# Patient Record
Sex: FEMALE | Race: WHITE | Employment: FULL TIME | URBAN - NONMETROPOLITAN AREA
[De-identification: names, ages, dates, MRNs, and addresses within clinical notes are randomized per-mention and may not be internally consistent; named-entity substitution may affect disease eponyms.]

---

## 2021-12-20 ENCOUNTER — OFFICE VISIT (OUTPATIENT)
Dept: OBGYN CLINIC | Age: 36
End: 2021-12-20
Payer: COMMERCIAL

## 2021-12-20 VITALS — BODY MASS INDEX: 26.56 KG/M2 | WEIGHT: 159.4 LBS | HEIGHT: 65 IN

## 2021-12-20 DIAGNOSIS — Z01.411 ABNORMAL FEMALE PELVIC EXAM: Primary | ICD-10-CM

## 2021-12-20 DIAGNOSIS — N85.2 ENLARGED UTERUS: ICD-10-CM

## 2021-12-20 DIAGNOSIS — Z87.59 HISTORY OF ECTOPIC PREGNANCY: ICD-10-CM

## 2021-12-20 DIAGNOSIS — Z31.69 ENCOUNTER FOR GENERAL COUNSELING AND ADVICE ON PROCREATION: ICD-10-CM

## 2021-12-20 LAB
CONTROL: NORMAL
PREGNANCY TEST URINE, POC: NEGATIVE

## 2021-12-20 PROCEDURE — 81025 URINE PREGNANCY TEST: CPT | Performed by: ADVANCED PRACTICE MIDWIFE

## 2021-12-20 PROCEDURE — 99385 PREV VISIT NEW AGE 18-39: CPT | Performed by: ADVANCED PRACTICE MIDWIFE

## 2021-12-20 RX ORDER — LEVOTHYROXINE SODIUM 0.1 MG/1
TABLET ORAL
COMMUNITY
Start: 2021-11-22

## 2021-12-20 ASSESSMENT — ENCOUNTER SYMPTOMS
ABDOMINAL PAIN: 0
SHORTNESS OF BREATH: 0
RESPIRATORY NEGATIVE: 1
CONSTIPATION: 1
DIARRHEA: 0

## 2021-12-20 NOTE — PROGRESS NOTES
YEARLY PHYSICAL    Date of service: 2021    Chico Zhou  Is a 39 y.o.   female    PT's PCP is: SUKHWINDER Morales CNP     : 1985                                             Subjective:       Patient's last menstrual period was 2021 (exact date). Are your menses regular: yes    OB History    Para Term  AB Living   5 4 4   1 4   SAB IAB Ectopic Molar Multiple Live Births       1     4      # Outcome Date GA Lbr Sean/2nd Weight Sex Delivery Anes PTL Lv   5 Ectopic 2016 6w0d          4 Term 2008 38w0d  6 lb 3 oz (2.807 kg) M Vag-Spont   KAUSHAL   3 Term 2007 38w0d  6 lb 4 oz (2.835 kg) M Vag-Spont   KAUSHAL   2 Term 2006 40w0d  6 lb 9 oz (2.977 kg) M Vag-Spont   KAUSHAL      Birth Comments: water birth   1 Term 2003 40w0d  7 lb 8 oz (3.402 kg) F Vag-Spont   KAUSHAL      Birth Comments: water birth        Social History     Tobacco Use   Smoking Status Former Smoker    Packs/day: 0.25    Years: 3.00    Pack years: 0.75    Types: Cigarettes   Smokeless Tobacco Never Used        Social History     Substance and Sexual Activity   Alcohol Use Yes    Comment: occasionally       Family History   Problem Relation Age of Onset    Thyroid Disease Mother        Any family history of breast or ovarian cancer: No    Any family history of blood clots: No      Allergies: Patient has no known allergies.       Current Outpatient Medications:     levothyroxine (SYNTHROID) 100 MCG tablet, , Disp: , Rfl:     Social History     Substance and Sexual Activity   Sexual Activity Not on file       Any bleeding or pain with intercourse: No    Last Yearly:  >3 years    Last pap: Due     Last HPV: Due for cotesting    Last Mammogram: n/a    Do you do self breast exams: Encouraged    Past Medical History:   Diagnosis Date    Anxiety     Depression     Hypotension     Hypothyroidism        Past Surgical History:   Procedure Laterality Date    LAPAROSCOPY      Ectopic 2016    OVARIAN CYST REMOVAL Right 2016    Laparoscopic    WISDOM TOOTH EXTRACTION         Family History   Problem Relation Age of Onset    Thyroid Disease Mother        Chief Complaint   Patient presents with    Annual Exam     New annual.Pt has been TTC for 6 years. Pt had ectopic surgery and has had not luck trying to conceive. Labs:    No results found for this visit on 12/20/21. HPI: Denies breast concerns. Denies pelvic pain or concerns. Menses regular. Due for pap smear but currently day 3 of cycle. Cycles are regular every 26-28 days. Has hx of ectopic pregnancy - patient reports was seeing an RE in [facility] as TTC x 6 years but no pregnancy yet. Spouse had SA and was on medications, attempted IUI but unable to fully go through due to low counts for partner. RE recommended she have HSG 2 years ago. Would like to do this in our office. Constipation present between cycles. Review of Systems   Constitutional: Negative. Negative for chills, fatigue and fever. HENT: Negative. Respiratory: Negative. Negative for shortness of breath. Cardiovascular: Negative. Negative for chest pain. Gastrointestinal: Positive for constipation (When not on cycle has trouble going to bathroom for BM). Negative for abdominal pain and diarrhea. Genitourinary: Negative for dysuria, enuresis, frequency, menstrual problem, pelvic pain, urgency and vaginal bleeding. Musculoskeletal: Negative. Neurological: Negative. Negative for dizziness, light-headedness and headaches. Psychiatric/Behavioral: Negative. Objective  Height 5' 5\" (1.651 m), weight 159 lb 6.4 oz (72.3 kg), last menstrual period 12/17/2021. Physical Exam  Constitutional:       Appearance: Normal appearance. She is normal weight. Genitourinary:      Vulva, bladder and urethral meatus normal.      Vaginal bleeding (menses) present. No vaginal discharge or tenderness. No vaginal prolapse present. Right Adnexa: not tender. Left Adnexa: not tender. Adnexa exam comments: Unable to palpate adnexa due to size of uterus. No cervical motion tenderness or discharge. Uterus is enlarged (approx 20 weeks pregnant size) and irregular (right side > left side). Uterus is not tender. Pelvic exam was performed with patient in the lithotomy position. Breasts: Breasts are symmetrical.      Breasts are soft. Right: No mass, nipple discharge, skin change or tenderness. Left: No mass, nipple discharge, skin change or tenderness. HENT:      Head: Normocephalic. Eyes:      Pupils: Pupils are equal, round, and reactive to light. Neck:      Comments: Left sided thyroid nodules - follows yearly with dr loving   Cardiovascular:      Rate and Rhythm: Normal rate and regular rhythm. Pulses: Normal pulses. Heart sounds: Normal heart sounds. No murmur heard. Pulmonary:      Effort: Pulmonary effort is normal.      Breath sounds: Normal breath sounds. No wheezing. Abdominal:      Palpations: Abdomen is soft. Tenderness: There is no abdominal tenderness. Musculoskeletal:         General: Normal range of motion. Cervical back: Normal range of motion. No muscular tenderness. Neurological:      Mental Status: She is alert and oriented to person, place, and time. Skin:     General: Skin is warm and dry. Psychiatric:         Behavior: Behavior normal.   Vitals and nursing note reviewed. Chaperone present: Declined. Assessment and Plan          Diagnosis Orders   1. Abnormal female pelvic exam  POCT urine pregnancy   2. Enlarged uterus  POCT urine pregnancy   3. Encounter for general counseling and advice on procreation  POCT urine pregnancy     Will return next week for pelvic USN and f/u. At f/u we will do pap smear due to menses today.   Discussed that pap often obscured by blood or insufficient cells when obtained on menses - she was agreeable    She reports that uterus is \"always big like this\" - has never been dx with fibroids. Neg pregnancy test in our office. Discussed concern about size of uterus and need to have imaging and possible surgery to rule out abnormals such as malignancy. Patient agreeable - we will call her to schedule this USN/visit. Discussed that likely needs to continue with RE - sounds as if spouse has low semen counts and likely cause of infertility, patient reported that she does develop follicles. Discussed that we can perform HSG per her preference in our office but if normal then needs to return to RE. Recommended no further w/u for fertility until imaging of uterus. I am having Jordan Montoya maintain her levothyroxine. Return in about 1 week (around 12/27/2021) for Pelvic USN, Gyn f/u. She was also counseled on her preventative health maintenance recommendations and follow-up. There are no Patient Instructions on file for this visit.     SUKHWINDER Stewart CNM,12/20/2021 12:24 PM

## 2021-12-28 ENCOUNTER — HOSPITAL ENCOUNTER (OUTPATIENT)
Age: 36
Setting detail: SPECIMEN
Discharge: HOME | End: 2021-12-28

## 2021-12-28 ENCOUNTER — TELEPHONE (OUTPATIENT)
Dept: OBGYN CLINIC | Age: 36
End: 2021-12-28

## 2021-12-28 ENCOUNTER — OFFICE VISIT (OUTPATIENT)
Dept: OBGYN CLINIC | Age: 36
End: 2021-12-28
Payer: COMMERCIAL

## 2021-12-28 VITALS
BODY MASS INDEX: 26.59 KG/M2 | WEIGHT: 159.6 LBS | SYSTOLIC BLOOD PRESSURE: 100 MMHG | DIASTOLIC BLOOD PRESSURE: 70 MMHG | HEIGHT: 65 IN

## 2021-12-28 DIAGNOSIS — N83.8 OVARIAN MASS, RIGHT: Primary | ICD-10-CM

## 2021-12-28 DIAGNOSIS — N83.8 OVARIAN MASS, RIGHT: ICD-10-CM

## 2021-12-28 DIAGNOSIS — Z12.4 SCREENING FOR CERVICAL CANCER: ICD-10-CM

## 2021-12-28 LAB
CA 125: 39 U/ML
CARCINOEMBRYONIC ANTIGEN: 0.8 NG/ML

## 2021-12-28 PROCEDURE — 36415 COLL VENOUS BLD VENIPUNCTURE: CPT | Performed by: ADVANCED PRACTICE MIDWIFE

## 2021-12-28 PROCEDURE — 99214 OFFICE O/P EST MOD 30 MIN: CPT | Performed by: ADVANCED PRACTICE MIDWIFE

## 2021-12-28 ASSESSMENT — ENCOUNTER SYMPTOMS
ABDOMINAL DISTENTION: 1
BACK PAIN: 1
ABDOMINAL PAIN: 1
CONSTIPATION: 1

## 2021-12-28 NOTE — PROGRESS NOTES
Follow-up     Pt here for USN f/u. Pt denies any new concerns.  Other     Pap due. Physical Exam  Constitutional:       Appearance: Normal appearance. She is normal weight. Genitourinary:      No vaginal discharge. Right Adnexa: mass present. Adnexa exam comments: USN shows mass on right adnexa but unable to differentiate origination with pelvic exam.      Cervical friability present. HENT:      Head: Normocephalic. Eyes:      Pupils: Pupils are equal, round, and reactive to light. Pulmonary:      Effort: Pulmonary effort is normal.   Musculoskeletal:         General: Normal range of motion. Cervical back: Normal range of motion. Neurological:      Mental Status: She is alert and oriented to person, place, and time. Skin:     General: Skin is warm and dry. Psychiatric:         Mood and Affect: Mood normal.         Behavior: Behavior normal.      Comments: Tearful intermittently during exam   Vitals and nursing note reviewed. Vital Signs  Blood pressure 100/70, height 5' 5\" (1.651 m), weight 159 lb 9.6 oz (72.4 kg), last menstrual period 12/17/2021. HPI Here for pap smear - was on menses last visit - and also USN f/u. Reports since last visit has \"paid more attention\" to s/s and has noted low back pain daily, abdominal fullness, pelvic pressure in addition to bowel changes. Results reviewed today:    Uterus appears normal size 8.6 x 5.5 x 5.0 cm  Endometrium 10.2mm - currently mid cycle  Left ovary normal with dominant follicle which appears 2.6 x 2.6 x 2.7cm  In right adnexa appears to have a cystic mass which originates possibly from right ovary - multiple thin striations. Area measures 18.7 x 9.6 x 18.6cm                              Assessment and Plan          Diagnosis Orders   1. Ovarian mass, right      CEA    CT ABDOMEN PELVIS W WO CONTRAST Additional Contrast? Radiologist Recommendation   2.  Screening for cervical cancer  PAP SMEAR Discussed concerns with patient regarding pelvic mass. Discussed additional w/u with CEA,  and CT scan. Discussed once get these results will determine next steps in care management - referral or surgery in our office. Discussed concerns of cystic area including but not limited to malignancy. Will consult Dr Karen Echevarria regarding mgmt and update plan accordingly. I am having Jordan Montoya maintain her levothyroxine. No follow-ups on file. She was also counseled on her preventative health maintenance recommendations and follow-up. There are no Patient Instructions on file for this visit.     SUKHWINDER Serra CNM,12/28/2021 2:36 PM                   Electronically signed by SUKHWINDER Serra CNM

## 2021-12-28 NOTE — TELEPHONE ENCOUNTER
Dr Ozzie Munoz -     Saw patient last week for exam and her desires to TTC. Last seen - [facility] - few years back and no pelvic mass/concerns. Had been working with RE but due to financials stopped the process. Has slowly had \"growing\" abd    When came in last week I noted approx 20 week pelvic mass and ordered imaging to be done. Reports last year had 20-30# weight loss but admits that made small diet changes - now has gained some weight back. Does have pelvic fullness, constipation, back pain. Today on imaging appears to have a large (18.7 x 9.6 x 18.6cm) cystic mass with multiple thin striations. Uterus itself looks like normal size. Left ovary normal    Concerned obviously of malignancy - today ordered CT scan abd/pelvis and CEA, - any further thought? I told her additional mgmt and likely surgery would depend on these results.

## 2021-12-29 ENCOUNTER — HOSPITAL ENCOUNTER (OUTPATIENT)
Dept: CT IMAGING | Age: 36
Discharge: HOME | End: 2021-12-31
Payer: COMMERCIAL

## 2021-12-29 DIAGNOSIS — N83.8 OVARIAN MASS, RIGHT: ICD-10-CM

## 2021-12-29 PROCEDURE — 74177 CT ABD & PELVIS W/CONTRAST: CPT

## 2021-12-29 PROCEDURE — 6360000004 HC RX CONTRAST MEDICATION: Performed by: ADVANCED PRACTICE MIDWIFE

## 2021-12-29 PROCEDURE — 74178 CT ABD&PLV WO CNTR FLWD CNTR: CPT

## 2021-12-29 RX ADMIN — IOPAMIDOL 18 ML: 755 INJECTION, SOLUTION INTRAVENOUS at 10:56

## 2021-12-29 RX ADMIN — IOPAMIDOL 75 ML: 755 INJECTION, SOLUTION INTRAVENOUS at 11:56

## 2021-12-29 NOTE — TELEPHONE ENCOUNTER
If nl markers then can consider local surgery - otherwise would suggest gyn onc; chances are that it is serous cystadenoma which is usu benign

## 2021-12-30 DIAGNOSIS — N83.8 OVARIAN MASS, RIGHT: Primary | ICD-10-CM

## 2021-12-30 LAB
HPV SAMPLE: NORMAL
HPV, GENOTYPE 16: NOT DETECTED
HPV, GENOTYPE 18: NOT DETECTED
HPV, HIGH RISK OTHER: NOT DETECTED
HPV, INTERPRETATION: NORMAL
SPECIMEN DESCRIPTION: NORMAL

## 2021-12-30 NOTE — RESULT ENCOUNTER NOTE
Called [facility] and Patient has appt with Dr. Gucci Upton 1/5 next week at 10:30. She is aware and they are going to be calling her to go over everything prior.

## 2021-12-30 NOTE — RESULT ENCOUNTER NOTE
Referrals and calls made to numerous places, waiting to hear back on soonest appt. Did call patient and let her know we were working on it, she is leaning towards the [facility] as opposed to [facility] in [place].

## 2021-12-30 NOTE — PROGRESS NOTES
Spoke with Dr Laila Mary staff on the phone to see soonest available appt and they do require to see the information prior to determining. Referral sent and they will call to let us know soonest available.

## 2021-12-30 NOTE — PROGRESS NOTES
Patient also interested in [facility] for referral, paper chart sent for review to determine soonest available appt.

## 2022-01-06 LAB — CYTOLOGY REPORT: NORMAL

## 2022-06-28 ENCOUNTER — OFFICE VISIT (OUTPATIENT)
Dept: OBGYN CLINIC | Age: 37
End: 2022-06-28
Payer: COMMERCIAL

## 2022-06-28 VITALS
WEIGHT: 164.8 LBS | SYSTOLIC BLOOD PRESSURE: 110 MMHG | HEIGHT: 65 IN | BODY MASS INDEX: 27.46 KG/M2 | DIASTOLIC BLOOD PRESSURE: 80 MMHG

## 2022-06-28 DIAGNOSIS — E04.1 THYROID CYST: ICD-10-CM

## 2022-06-28 DIAGNOSIS — L65.9 THINNING HAIR: ICD-10-CM

## 2022-06-28 DIAGNOSIS — L85.3 DRY SKIN: Primary | ICD-10-CM

## 2022-06-28 DIAGNOSIS — Z13.220 SCREENING FOR LIPID DISORDERS: ICD-10-CM

## 2022-06-28 DIAGNOSIS — E03.9 HYPOTHYROIDISM, UNSPECIFIED TYPE: ICD-10-CM

## 2022-06-28 DIAGNOSIS — Z13.0 SCREENING FOR DEFICIENCY ANEMIA: ICD-10-CM

## 2022-06-28 DIAGNOSIS — Z13.89 ENCOUNTER FOR SCREENING FOR OTHER DISORDER: ICD-10-CM

## 2022-06-28 PROCEDURE — 99213 OFFICE O/P EST LOW 20 MIN: CPT | Performed by: ADVANCED PRACTICE MIDWIFE

## 2022-06-28 NOTE — PROGRESS NOTES
PROBLEM VISIT     Date of service: 2022    Rhonda Blevins  Is a 39 y.o.  female    PT's PCP is: Ya Richards DO     : 1985                                          Review of Systems   Constitutional:        \"just cannot lose weight\"     HENT: Negative. Genitourinary: Negative. Musculoskeletal: Negative. Neurological: Negative. Psychiatric/Behavioral: Negative. Patient's last menstrual period was 2022 (exact date). OB History    Para Term  AB Living   5 4 4   1 4   SAB IAB Ectopic Molar Multiple Live Births       1     4      # Outcome Date GA Lbr Sean/2nd Weight Sex Delivery Anes PTL Lv   5 Ectopic 2016 6w0d          4 Term 2008 38w0d  6 lb 3 oz (2.807 kg) M Vag-Spont   KAUSHAL   3 Term 2007 38w0d  6 lb 4 oz (2.835 kg) M Vag-Spont   KAUSHAL   2 Term 2006 40w0d  6 lb 9 oz (2.977 kg) M Vag-Spont   KAUSHAL      Birth Comments: water birth   1 Term 2003 40w0d  7 lb 8 oz (3.402 kg) F Vag-Spont   KAUSHAL      Birth Comments: water birth        Social History     Tobacco Use   Smoking Status Former Smoker    Packs/day: 0.25    Years: 3.00    Pack years: 0.75    Types: Cigarettes   Smokeless Tobacco Never Used        Social History     Substance and Sexual Activity   Alcohol Use Yes    Comment: occasionally       Allergies: Patient has no known allergies. Current Outpatient Medications:     levothyroxine (SYNTHROID) 100 MCG tablet, , Disp: , Rfl:     Social History     Substance and Sexual Activity   Sexual Activity Not on file       Chief Complaint   Patient presents with    Other     Pt stated has a mass and went to Counts include [facility] beds at the [facility]. Pt would like exam to get \"everything checked and make sure hormone levels are where they need to be\". Physical Exam  Constitutional:       Appearance: Normal appearance. She is normal weight. HENT:      Head: Normocephalic. Eyes:      Pupils: Pupils are equal, round, and reactive to light.    Neck:      Comments: Palpable enlarged possible nodule top left and bottom right of thyroid  Pulmonary:      Effort: Pulmonary effort is normal.   Musculoskeletal:         General: Normal range of motion. Cervical back: Normal range of motion. Neurological:      Mental Status: She is alert and oriented to person, place, and time. Skin:     General: Skin is warm and dry. Psychiatric:         Behavior: Behavior normal.   Vitals and nursing note reviewed. Vital Signs  Blood pressure 110/80, height 5' 5\" (1.651 m), weight 164 lb 12.8 oz (74.8 kg), last menstrual period 06/26/2022. HPI Want to have \"everything checked\" to make sure all levels are \"normal\". Hair \"falling out\", dry skin, teeth clenched while sleep and even some during the day. Also reports \"feel dehydrated\" but drinks >1 gallon of water daily. Trouble losing weight - regarding exercise - \"with the surgery had limitations\" and before was just not much energy to do it. Now starting to reintegrate more workouts around the home. Up and active, very busy in daily life. Diet information - 3 meals/day, sometimes snacks between. Eats salad, fruit, veggies, walnuts, dried cranberries, ETOH occasionally (few times weekly), occasional CHO intake. Cycles remain regular. Monthly, bleeds for approx 7 days, some clots, dysmenorrhea for 1-2 days. Voids every 2-3 hours. Thyroid managed by - sees Dr Eva Hewitt yearly around August - has been stable for >12 months. Has not had wellness labs done that can recall. Results reviewed today:    Reviewed result of 2015 thyroid USN x 2 in epic system                              Assessment and Plan          Diagnosis Orders   1. Dry skin     2. Thinning hair     3. Thyroid cyst     4.  Hypothyroidism, unspecified type         Discussed limitations for w/u when patient requested \"hormone\" labs - discussed that having regular monthly cycles so would not necessarily aid in much information    Discussed could check additional labs - vitamin levels, cholesterol, iron. Can check thyroid levels but also needs to make sure has upcoming visit with endocrine and also possibly repeat thyroid USn. She is agreeable and understanding. Will return fasting for labs      I am having Kristy Pena maintain her levothyroxine. No follow-ups on file. She was also counseled on her preventative health maintenance recommendations and follow-up. There are no Patient Instructions on file for this visit.     SUKHWINDER Farmer CNM,6/28/2022 12:19 PM                   Electronically signed by SUKHWINDER Farmer CNM

## 2022-07-20 LAB
ABSOLUTE BASO #: 0.05 K/UL (ref 0–0.2)
ABSOLUTE EOS #: 0.24 K/UL (ref 0–0.5)
ABSOLUTE LYMPH #: 1.76 K/UL (ref 1–4)
ABSOLUTE MONO #: 0.48 K/UL (ref 0.2–1)
ABSOLUTE NEUT #: 2.6 K/UL (ref 1.5–7.5)
BASOPHILS RELATIVE PERCENT: 1 %
EOSINOPHILS RELATIVE PERCENT: 4.7 %
HCT VFR BLD CALC: 36.6 % (ref 34–45)
HEMOGLOBIN: 12.4 G/DL (ref 11.5–15.5)
LYMPHOCYTE %: 34.2 %
MCH RBC QN AUTO: 31.4 PG (ref 25–33)
MCHC RBC AUTO-ENTMCNC: 33.9 G/DL (ref 31–36)
MCV RBC AUTO: 92.7 FL (ref 80–99)
MONOCYTES # BLD: 9.3 %
NEUTROPHILS RELATIVE PERCENT: 50.6 %
PDW BLD-RTO: 12.1 % (ref 11.5–15)
PLATELETS: 313 K/UL (ref 130–400)
PMV BLD AUTO: 10 FL (ref 9.3–13)
RBC: 3.95 M/UL (ref 3.8–5.4)
WBC: 5.1 K/UL (ref 3.5–11)

## 2022-07-21 LAB
CHOLESTEROL/HDL RATIO: 3.7 RATIO
CHOLESTEROL: 145 MG/DL
HDLC SERPL-MCNC: 39 MG/DL
IRON SATURATION: 43 % (ref 20–50)
IRON, SERUM: 124 UG/DL (ref 37–145)
LDL CHOLESTEROL CALCULATED: 88 MG/DL
LDL/HDL RATIO: 2.3 RATIO
TOTAL IRON BINDING CAPACITY: 288 UG/DL (ref 250–450)
TRIGL SERPL-MCNC: 89 MG/DL
TSH SERPL DL<=0.05 MIU/L-ACNC: 1.5 UIU/ML (ref 0.4–4.1)
UNSATURATED IRON BINDING CAPACITY: 164 UG/DL (ref 112–347)
VITAMIN B-12: 500 PG/ML (ref 200–950)
VITAMIN D 25-HYDROXY: 49 NG/ML
VLDLC SERPL CALC-MCNC: 18 MG/DL

## 2022-07-23 LAB — INSULIN: 6 UIU/ML (ref 2–21)

## 2022-10-15 ENCOUNTER — NURSE TRIAGE (OUTPATIENT)
Dept: OTHER | Age: 37
End: 2022-10-15

## 2022-10-15 NOTE — TELEPHONE ENCOUNTER
Reason for Disposition   Health Information question, no triage required and triager able to answer question    Protocols used: Information Only Call - No Triage-ADULT-    Patient reports having UTI symptoms for a couple days but they are worsening. Patient reports trying to take yogurt and probiotics but no improvement. Patient reports pain in urinary tract with urination and frequency. Writer recommends patient to be seen this weekend if she feels symptoms are worsening per office guidelines. Patient states she will go to urgent care if she has time and is agreeable to call office Monday morning if she does not go to urgent care.

## 2022-10-31 ENCOUNTER — OFFICE VISIT (OUTPATIENT)
Dept: OBGYN CLINIC | Age: 37
End: 2022-10-31
Payer: COMMERCIAL

## 2022-10-31 VITALS
WEIGHT: 169.2 LBS | SYSTOLIC BLOOD PRESSURE: 110 MMHG | HEIGHT: 65 IN | DIASTOLIC BLOOD PRESSURE: 70 MMHG | BODY MASS INDEX: 28.19 KG/M2

## 2022-10-31 DIAGNOSIS — Z87.440 HISTORY OF UTI: ICD-10-CM

## 2022-10-31 DIAGNOSIS — Z87.42: Primary | ICD-10-CM

## 2022-10-31 PROCEDURE — 99213 OFFICE O/P EST LOW 20 MIN: CPT | Performed by: ADVANCED PRACTICE MIDWIFE

## 2022-10-31 RX ORDER — BROMPHENIRAMINE MALEATE, PSEUDOEPHEDRINE HYDROCHLORIDE, AND DEXTROMETHORPHAN HYDROBROMIDE 2; 30; 10 MG/5ML; MG/5ML; MG/5ML
SYRUP ORAL
COMMUNITY
Start: 2022-10-27

## 2022-10-31 ASSESSMENT — ENCOUNTER SYMPTOMS
RESPIRATORY NEGATIVE: 1
ABDOMINAL DISTENTION: 1

## 2022-10-31 NOTE — PROGRESS NOTES
PROBLEM VISIT     Date of service: 10/31/2022    Emily Romano  Is a 40 y.o.  female    PT's PCP is: Zulema Doe DO     : 1985                                          HPI Here to discuss changes within the last couple weeks. Reports that started to have UTI s/s and pelvic cramping - started same day left for [facility] with faisal class - so when returned could not get in with pcp, our office was closed, went to urgent care. At urgent care dx with UTI - started on a 5 day script (presume macrobid, no records available at time of visit), UTI s/s improved. Reports in the midst of this all had abnormal uterine bleeding - often once cycle a month, 7 days. This was 10 days of heavy bleeding, dysmenorrhea, pain with sex. Patient questions if could have been miscarriage - did not miss a cycle, did not take a HPT. Still having slight bloating but otherwise all s/s have resolved fully    Review of Systems   Constitutional: Negative. HENT: Negative. Respiratory: Negative. Cardiovascular: Negative. Gastrointestinal:  Positive for abdominal distention. Genitourinary:  Positive for menstrual problem and pelvic pain. Skin: Negative. Psychiatric/Behavioral: Negative. Patient's last menstrual period was 10/12/2022 (approximate).    OB History    Para Term  AB Living   5 4 4   1 4   SAB IAB Ectopic Molar Multiple Live Births       1     4      # Outcome Date GA Lbr Sean/2nd Weight Sex Delivery Anes PTL Lv   5 Ectopic 2016 6w0d          4 Term 2008 38w0d  6 lb 3 oz (2.807 kg) M Vag-Spont   KAUSHAL   3 Term 2007 38w0d  6 lb 4 oz (2.835 kg) M Vag-Spont   KAUSHAL   2 Term 2006 40w0d  6 lb 9 oz (2.977 kg) M Vag-Spont   KAUSHAL      Birth Comments: water birth   1 Term 2003 40w0d  7 lb 8 oz (3.402 kg) F Vag-Spont   KAUSHAL      Birth Comments: water birth        Social History     Tobacco Use   Smoking Status Former    Packs/day: 0.25    Years: 3.00    Pack years: 0.75 Types: Cigarettes   Smokeless Tobacco Never        Social History     Substance and Sexual Activity   Alcohol Use Yes    Comment: occasionally       Allergies: Patient has no known allergies. Current Outpatient Medications:     brompheniramine-pseudoephedrine-DM 2-30-10 MG/5ML syrup, , Disp: , Rfl:     levothyroxine (SYNTHROID) 100 MCG tablet, , Disp: , Rfl:     Social History     Substance and Sexual Activity   Sexual Activity Not on file       Chief Complaint   Patient presents with    Follow-up     Pt here for urgent care f/u. C/o cycle was heavy and Was bleeding for 10 days after first cycle this month. Had cramping and pelvic pain. Was dx with UTI. Denies any UTI sx today. Physical Exam  Constitutional:       Appearance: Normal appearance. She is normal weight. HENT:      Head: Normocephalic. Eyes:      Pupils: Pupils are equal, round, and reactive to light. Pulmonary:      Effort: Pulmonary effort is normal.   Musculoskeletal:         General: Normal range of motion. Cervical back: Normal range of motion. Neurological:      Mental Status: She is alert and oriented to person, place, and time. Skin:     General: Skin is warm and dry. Psychiatric:         Behavior: Behavior normal.   Vitals and nursing note reviewed. Vital Signs  Blood pressure 110/70, height 5' 5\" (1.651 m), weight 169 lb 3.2 oz (76.7 kg), last menstrual period 10/12/2022. Assessment and Plan          Diagnosis Orders   1. Hx of abnormal menstrual cycle        2. History of UTI            Awaiting records from urgent care which were requested by Anel Kim, front staff  Discussed possible causes of abnormal uterine bleeding - since all s/s have now resolved patient agreeable to monitor. However, if pain, AUB, or new s/s return then patient going to call office for USN and f/u. I am having Kristy Pena maintain her levothyroxine and brompheniramine-pseudoephedrine-DM. No follow-ups on file. She was also counseled on her preventative health maintenance recommendations and follow-up. There are no Patient Instructions on file for this visit.     SUKHWINDER Christian CNM,10/31/2022 12:56 PM                     Electronically signed by SUKHWINDER Max CNM

## 2023-02-06 ENCOUNTER — TELEPHONE (OUTPATIENT)
Dept: OBGYN CLINIC | Age: 38
End: 2023-02-06

## 2023-02-06 DIAGNOSIS — N91.2 AMENORRHEA: Primary | ICD-10-CM

## 2023-02-06 NOTE — TELEPHONE ENCOUNTER
Patient called back stating that she is able to see her results on MyChart.  Her progesterone level was 8.4 and HCG was 104.1.  Can you please review and give recommendations?

## 2023-02-06 NOTE — TELEPHONE ENCOUNTER
Per Hong Sierra. Patient really needs to wait until 6 wks to get an ultrasound in order to be able to visualize anything. Patient can also get progesterone level today, order for that also faxed to [facility]. Patient again reminded if severe pain go to ER.

## 2023-02-06 NOTE — TELEPHONE ENCOUNTER
Patient called back again requesting that we go ahead and fax HCG order to [facility]. I informed patient that Aba Baldwin said we can order USN at 6 wks and she will need to go to ER if pain worsens or becomes severe. Patient asking if she can just get an ultrasound now. She is very worried about ectopic because of her history of ectopic and only having one ovary/tube.  Order faxed

## 2023-02-06 NOTE — TELEPHONE ENCOUNTER
Per VO from Carrie, patient needs 100 mg. Rx for #30 with 2 refills called to [facility] [place]. Patient aware, no further questions/concerns voiced.

## 2023-02-06 NOTE — TELEPHONE ENCOUNTER
Patient called. She reports positive pregnancy test on Friday. LMP Jan. 4th. She reports light spotting over the weekend only when she wipes. She has history of ectopic on left side and has had right ovary removed. She also reports a little nausea, mild cramping, and a little bit of back pain. She said her endocrinologist told her to inform them ASAP if she ever had a positive pregnancy test because she is hypothyroid. She called them on Friday and they ordered labs which she had done on Saturday at [facility]. Patient thinks they may have ordered HCG but is unsure. I requested labs from [facility] and am waiting on those.

## 2023-02-08 DIAGNOSIS — N91.2 AMENORRHEA: Primary | ICD-10-CM

## 2023-02-08 NOTE — TELEPHONE ENCOUNTER
Pt called back today stating she has labs drawn and the progesterone is 7.8 and HCG is 164.6. Pt started her suppositories. Pt wondering if there is anything else she needs to do. Please advise.

## 2023-02-08 NOTE — TELEPHONE ENCOUNTER
Pt aware of results and would like repeat lab faxed to [facility]. PT sent up front to schedule appt.

## 2023-02-13 ENCOUNTER — HOSPITAL ENCOUNTER (OUTPATIENT)
Age: 38
Discharge: HOME | End: 2023-02-13
Payer: COMMERCIAL

## 2023-02-13 ENCOUNTER — HOSPITAL ENCOUNTER (OUTPATIENT)
Dept: INFUSION THERAPY | Age: 38
Discharge: HOME | End: 2023-02-13
Payer: COMMERCIAL

## 2023-02-13 ENCOUNTER — TELEPHONE (OUTPATIENT)
Dept: OBGYN CLINIC | Age: 38
End: 2023-02-13
Payer: COMMERCIAL

## 2023-02-13 ENCOUNTER — OFFICE VISIT (OUTPATIENT)
Dept: OBGYN CLINIC | Age: 38
End: 2023-02-13
Payer: COMMERCIAL

## 2023-02-13 VITALS — BODY MASS INDEX: 29.12 KG/M2 | WEIGHT: 175 LBS | DIASTOLIC BLOOD PRESSURE: 60 MMHG | SYSTOLIC BLOOD PRESSURE: 94 MMHG

## 2023-02-13 VITALS
DIASTOLIC BLOOD PRESSURE: 60 MMHG | TEMPERATURE: 98.1 F | RESPIRATION RATE: 16 BRPM | SYSTOLIC BLOOD PRESSURE: 105 MMHG | HEART RATE: 62 BPM

## 2023-02-13 DIAGNOSIS — N91.1 AMENORRHEA, SECONDARY: ICD-10-CM

## 2023-02-13 DIAGNOSIS — L85.3 DRY SKIN: ICD-10-CM

## 2023-02-13 DIAGNOSIS — L65.9 THINNING HAIR: ICD-10-CM

## 2023-02-13 DIAGNOSIS — O02.81 INAPPROPRIATE CHANGE IN QUANTITATIVE HCG IN EARLY PREGNANCY: ICD-10-CM

## 2023-02-13 DIAGNOSIS — O00.202 LEFT OVARIAN PREGNANCY WITHOUT INTRAUTERINE PREGNANCY: ICD-10-CM

## 2023-02-13 DIAGNOSIS — O00.202 LEFT OVARIAN PREGNANCY WITHOUT INTRAUTERINE PREGNANCY: Primary | ICD-10-CM

## 2023-02-13 DIAGNOSIS — E04.1 THYROID CYST: ICD-10-CM

## 2023-02-13 DIAGNOSIS — Z13.220 SCREENING FOR LIPID DISORDERS: ICD-10-CM

## 2023-02-13 DIAGNOSIS — E03.9 HYPOTHYROIDISM, UNSPECIFIED TYPE: ICD-10-CM

## 2023-02-13 DIAGNOSIS — Z13.89 ENCOUNTER FOR SCREENING FOR OTHER DISORDER: ICD-10-CM

## 2023-02-13 DIAGNOSIS — Z13.0 SCREENING FOR DEFICIENCY ANEMIA: ICD-10-CM

## 2023-02-13 PROBLEM — O00.90 ECTOPIC PREGNANCY: Status: ACTIVE | Noted: 2023-02-13

## 2023-02-13 LAB
25(OH)D3 SERPL-MCNC: 41.1 NG/ML
ABO/RH: NORMAL
ABSOLUTE EOS #: 0.27 K/UL (ref 0–0.44)
ABSOLUTE IMMATURE GRANULOCYTE: <0.03 K/UL (ref 0–0.3)
ABSOLUTE LYMPH #: 1.78 K/UL (ref 1.1–3.7)
ABSOLUTE MONO #: 0.49 K/UL (ref 0.1–1.2)
ALBUMIN SERPL-MCNC: 4.2 G/DL (ref 3.5–5.2)
ALBUMIN/GLOBULIN RATIO: 1.4 (ref 1–2.5)
ALP SERPL-CCNC: 56 U/L (ref 35–104)
ALT SERPL-CCNC: 9 U/L (ref 5–33)
ANTIBODY SCREEN: NEGATIVE
ARM BAND NUMBER: NORMAL
AST SERPL-CCNC: 11 U/L
BASOPHILS # BLD: 1 % (ref 0–2)
BASOPHILS ABSOLUTE: 0.05 K/UL (ref 0–0.2)
BILIRUB DIRECT SERPL-MCNC: <0.1 MG/DL
BILIRUB INDIRECT SERPL-MCNC: ABNORMAL MG/DL (ref 0–1)
BILIRUB SERPL-MCNC: <0.1 MG/DL (ref 0.3–1.2)
BUN SERPL-MCNC: 7 MG/DL (ref 6–20)
CREAT SERPL-MCNC: 0.78 MG/DL (ref 0.5–0.9)
EOSINOPHILS RELATIVE PERCENT: 5 % (ref 1–4)
EXPIRATION DATE: NORMAL
GFR SERPL CREATININE-BSD FRML MDRD: >60 ML/MIN/1.73M2
HCG QUANTITATIVE: 96 MIU/ML
HCT VFR BLD AUTO: 36.3 % (ref 36.3–47.1)
HGB BLD-MCNC: 12.1 G/DL (ref 11.9–15.1)
IMMATURE GRANULOCYTES: 0 %
INSULIN REFERENCE RANGE:: NORMAL
INSULIN: 25.8 MU/L
IRON SATURATION: 34 % (ref 20–55)
IRON SERPL-MCNC: 93 UG/DL (ref 37–145)
LYMPHOCYTES # BLD: 33 % (ref 24–43)
MCH RBC QN AUTO: 32.8 PG (ref 25.2–33.5)
MCHC RBC AUTO-ENTMCNC: 33.3 G/DL (ref 28.4–34.8)
MCV RBC AUTO: 98.4 FL (ref 82.6–102.9)
MONOCYTES # BLD: 9 % (ref 3–12)
NRBC AUTOMATED: 0 PER 100 WBC
PDW BLD-RTO: 11.4 % (ref 11.8–14.4)
PLATELET # BLD AUTO: 305 K/UL (ref 138–453)
PMV BLD AUTO: 9.5 FL (ref 8.1–13.5)
PROT SERPL-MCNC: 7.1 G/DL (ref 6.4–8.3)
RBC # BLD: 3.69 M/UL (ref 3.95–5.11)
SEG NEUTROPHILS: 52 % (ref 36–65)
SEGMENTED NEUTROPHILS ABSOLUTE COUNT: 2.78 K/UL (ref 1.5–8.1)
TIBC SERPL-MCNC: 277 UG/DL (ref 250–450)
TSH SERPL-ACNC: 1.48 UIU/ML (ref 0.3–5)
UNSATURATED IRON BINDING CAPACITY: 184 UG/DL (ref 112–347)
VIT B12 SERPL-MCNC: 484 PG/ML (ref 232–1245)
WBC # BLD AUTO: 5.4 K/UL (ref 3.5–11.3)

## 2023-02-13 PROCEDURE — 86900 BLOOD TYPING SEROLOGIC ABO: CPT

## 2023-02-13 PROCEDURE — 83540 ASSAY OF IRON: CPT

## 2023-02-13 PROCEDURE — 84520 ASSAY OF UREA NITROGEN: CPT

## 2023-02-13 PROCEDURE — 96401 CHEMO ANTI-NEOPL SQ/IM: CPT

## 2023-02-13 PROCEDURE — 36415 COLL VENOUS BLD VENIPUNCTURE: CPT

## 2023-02-13 PROCEDURE — 82607 VITAMIN B-12: CPT

## 2023-02-13 PROCEDURE — 6360000002 HC RX W HCPCS: Performed by: OBSTETRICS & GYNECOLOGY

## 2023-02-13 PROCEDURE — 80076 HEPATIC FUNCTION PANEL: CPT

## 2023-02-13 PROCEDURE — 83525 ASSAY OF INSULIN: CPT

## 2023-02-13 PROCEDURE — 85025 COMPLETE CBC W/AUTO DIFF WBC: CPT

## 2023-02-13 PROCEDURE — 86850 RBC ANTIBODY SCREEN: CPT

## 2023-02-13 PROCEDURE — 84443 ASSAY THYROID STIM HORMONE: CPT

## 2023-02-13 PROCEDURE — 82565 ASSAY OF CREATININE: CPT

## 2023-02-13 PROCEDURE — 96372 THER/PROPH/DIAG INJ SC/IM: CPT | Performed by: OBSTETRICS & GYNECOLOGY

## 2023-02-13 PROCEDURE — 86901 BLOOD TYPING SEROLOGIC RH(D): CPT

## 2023-02-13 PROCEDURE — 99214 OFFICE O/P EST MOD 30 MIN: CPT | Performed by: ADVANCED PRACTICE MIDWIFE

## 2023-02-13 PROCEDURE — 84702 CHORIONIC GONADOTROPIN TEST: CPT

## 2023-02-13 PROCEDURE — 82306 VITAMIN D 25 HYDROXY: CPT

## 2023-02-13 PROCEDURE — 83550 IRON BINDING TEST: CPT

## 2023-02-13 RX ORDER — ONDANSETRON 2 MG/ML
8 INJECTION INTRAMUSCULAR; INTRAVENOUS
OUTPATIENT
Start: 2023-02-13

## 2023-02-13 RX ORDER — EPINEPHRINE 1 MG/ML
0.3 INJECTION, SOLUTION, CONCENTRATE INTRAVENOUS PRN
OUTPATIENT
Start: 2023-02-13

## 2023-02-13 RX ORDER — ALBUTEROL SULFATE 90 UG/1
4 AEROSOL, METERED RESPIRATORY (INHALATION) PRN
OUTPATIENT
Start: 2023-02-13

## 2023-02-13 RX ORDER — ACETAMINOPHEN 325 MG/1
650 TABLET ORAL
OUTPATIENT
Start: 2023-02-13

## 2023-02-13 RX ORDER — DIPHENHYDRAMINE HYDROCHLORIDE 50 MG/ML
50 INJECTION INTRAMUSCULAR; INTRAVENOUS
OUTPATIENT
Start: 2023-02-13

## 2023-02-13 RX ORDER — METHOTREXATE 25 MG/ML
50 INJECTION INTRA-ARTERIAL; INTRAMUSCULAR; INTRATHECAL; INTRAVENOUS ONCE
Status: COMPLETED | OUTPATIENT
Start: 2023-02-13 | End: 2023-02-13

## 2023-02-13 RX ORDER — METHOTREXATE SODIUM 25 MG/ML
50 INJECTION, SOLUTION INTRA-ARTERIAL; INTRAMUSCULAR; INTRAVENOUS ONCE
Status: CANCELLED | OUTPATIENT
Start: 2023-02-13 | End: 2023-02-13

## 2023-02-13 RX ORDER — SODIUM CHLORIDE 9 MG/ML
INJECTION, SOLUTION INTRAVENOUS CONTINUOUS
OUTPATIENT
Start: 2023-02-13

## 2023-02-13 RX ADMIN — METHOTREXATE 95.5 MG: 25 SOLUTION INTRA-ARTERIAL; INTRAMUSCULAR; INTRATHECAL; INTRAVENOUS at 14:19

## 2023-02-13 ASSESSMENT — ENCOUNTER SYMPTOMS
NAUSEA: 0
SHORTNESS OF BREATH: 0
RESPIRATORY NEGATIVE: 1
VOMITING: 0
BACK PAIN: 0

## 2023-02-13 NOTE — PROGRESS NOTES
PROBLEM VISIT     Date of service: 2023    Danette Sampson  Is a 40 y.o.  female    PT's PCP is: Laura Arthur DO     : 1985                                          HPI Here for viability USN f/u. Reports that she and partner did conceive on their own - were no longer working with RE - and had decided \"if it happened then it would be okay\". Early  did have full right oophorectomy due to cystic adenoma. Has also had left ectopic pregnancy - had surgical treatment for this . Cycles had been regular overall (, , ) however October \"was kind of a mess\" with cycles. Called  with nausea, cramping, back pain, light spotting - spotting has resolved, back pain has also resolved. Reports currently having some mild cramping \"like a strain or stretch\" and increased fatigue. Nausea was present but has now resolved. Review of Systems   Constitutional:  Positive for fatigue. Respiratory: Negative. Negative for shortness of breath. Cardiovascular: Negative. Negative for chest pain. Gastrointestinal:  Negative for nausea and vomiting. Genitourinary:  Positive for menstrual problem (amenorrhea) and pelvic pain (\"cramping\" described as \"like a strain or stretch\"). Negative for vaginal bleeding, vaginal discharge and vaginal pain. Musculoskeletal:  Negative for back pain. Neurological: Negative. Psychiatric/Behavioral: Negative. Patient's last menstrual period was 2023 (exact date).    OB History    Para Term  AB Living   5 4 4   1 4   SAB IAB Ectopic Molar Multiple Live Births       1     4      # Outcome Date GA Lbr Sean/2nd Weight Sex Delivery Anes PTL Lv   5 Ectopic 2016 6w0d          4 Term 2008 38w0d  6 lb 3 oz (2.807 kg) M Vag-Spont   KAUSHAL   3 Term 2007 38w0d  6 lb 4 oz (2.835 kg) M Vag-Spont   KAUSHAL   2 Term 2006 40w0d  6 lb 9 oz (2.977 kg) M Vag-Spont   KAUSHAL      Birth Comments: water birth   1 Term 2003 40w0d  7 lb 8 oz (3.402 kg) F Vag-Spont   KAUSHAL      Birth Comments: water birth        Social History     Tobacco Use   Smoking Status Former    Packs/day: 0.25    Years: 3.00    Pack years: 0.75    Types: Cigarettes   Smokeless Tobacco Never        Social History     Substance and Sexual Activity   Alcohol Use Yes    Comment: occasionally       Allergies: Patient has no known allergies. Current Outpatient Medications:     Prenatal Vit-Fe Fumarate-FA (PRENATAL VITAMIN PO), Take by mouth, Disp: , Rfl:     Probiotic Product (PROBIOTIC PO), Take by mouth, Disp: , Rfl:     progesterone (ENDOMETRIN) 100 MG suppository, Place 100 mg vaginally daily, Disp: , Rfl:     levothyroxine (SYNTHROID) 100 MCG tablet, , Disp: , Rfl:     Social History     Substance and Sexual Activity   Sexual Activity Not on file       Chief Complaint   Patient presents with    Follow-up     Patient here for USN f/u. LMP Jan 4th. Patient reports \"stretching\" type pain, mild discomfort all over in pelvis. She had a little bit of very light spotting only when she wiped, this has not happened in over a week. Physical Exam  Constitutional:       Appearance: Normal appearance. She is normal weight. HENT:      Head: Normocephalic. Eyes:      Pupils: Pupils are equal, round, and reactive to light. Pulmonary:      Effort: Pulmonary effort is normal.   Musculoskeletal:         General: Normal range of motion. Cervical back: Normal range of motion. Neurological:      Mental Status: She is alert and oriented to person, place, and time. Skin:     General: Skin is warm and dry. Psychiatric:         Behavior: Behavior normal.   Vitals and nursing note reviewed. Vital Signs  Blood pressure 94/60, weight 175 lb (79.4 kg), last menstrual period 01/04/2023.                         Results reviewed today:    HCG results reviewed - they were done at [facility]:  2/6/2023 HCG = 104.1  2/8/2023 HCG = 164.6  2/10/2023 HCG = 151.2    USN today:  No gestational sac or fetal pole seen in uterus on ultrasounds - should be 5 5/7 weeks gestation today  Endometrium 9.3mm  Right oophorectomy  Solid appearing area on the left ovary with peripheral blood flow which measures 1.8 x 1.2 x 1.8cm                              Assessment and Plan          Diagnosis Orders   1. Left ovarian pregnancy without intrauterine pregnancy        2. Inappropriate change in quantitative hCG in early pregnancy            Discussed with patient that HCG levels alone did not rise appropriately and from 2/8 to 2/10 they did decrease confirming that pregnancy is not viable    We also discussed area on the left adnexal region - suspicious of ectopic pregnancy. Consulted Dr Raz Mcallister - we reviewed patient blood work, USN findings - suggestive of ectopic pregnancy - will treat as such. Per Dr Raz Mcallister - to [facility] for methotrexate. Discussed all with patient - side effects of methotrexate, warning s/s that would be suggestive of ruptured ectopic pregnancy, and follow up. Patient was understanding. Strongly encouraged patient that if plans to not prevent pregnancy, if open to pregnancy, then consider HSG to evaluate left tube patency since this is second ectopic on that side and only has the one tube/ovary left. She was understanding and will consider      I am having Kristy Pena maintain her levothyroxine, Prenatal Vit-Fe Fumarate-FA (PRENATAL VITAMIN PO), Probiotic Product (PROBIOTIC PO), and progesterone. No follow-ups on file. She was also counseled on her preventative health maintenance recommendations and follow-up. There are no Patient Instructions on file for this visit.     SUKHWINDER Serra CNM,2/13/2023 12:09 PM                     Electronically signed by SUKHWINDER Serra CNM

## 2023-02-13 NOTE — DISCHARGE INSTRUCTIONS
Outpatient Instructions for IM or Subcutaneous Injections    [address]  [address] [facility]  [phone]      You are advised to carry out the following instructions:      Diet:  As prescribed by your physician. Activity:  As prescribed by your physician. Care of the injection site: If your injection site becomes red, sore, swollen, painful,has drainage, or you develop a fever notify your Physician. Other:    If you develop hives, rash, itching or have trouble breathing or any unusual symptoms, go to the nearest Emergency Room. These could be signs of an allergic reaction to the medication.         Follow up appointment:               ANY PROBLEMS OR CONCERNS FOLLOW UP WITH YOUR PHYSICIAN                                                            OR                 GO TO THE NEAREST EMERGENCY ROOM

## 2023-02-13 NOTE — TELEPHONE ENCOUNTER
Patient was here for a viability ultrasound and follow up. Her ultrasound is suspicious for a left ectopic pregnancy and patient has opted to have methotrexate injection. Spoke to outpatient services and patient is scheduled for today at 1 pm for the injection. She will need to go to outpatient lab at 12:30 pm to have her labs drawn. Patient aware of this and verbalized understanding. Order for [facility] levels faxed to [facility]. Information on Methotrexate injection e-mailed to patient.

## 2023-02-15 DIAGNOSIS — N91.2 AMENORRHEA: ICD-10-CM

## 2023-02-22 ENCOUNTER — TELEPHONE (OUTPATIENT)
Dept: OBGYN CLINIC | Age: 38
End: 2023-02-22

## 2023-02-22 DIAGNOSIS — Z87.59 HISTORY OF ECTOPIC PREGNANCY: Primary | ICD-10-CM

## 2023-02-22 NOTE — TELEPHONE ENCOUNTER
Patient called. She reports that she did her HCG this morning and it was 3.3. She wants to schedule HSG in [place]. Advised patient that I would fax the order and someone should contact her to schedule that, it should be done between cycle day 5-9.

## 2023-02-23 RX ORDER — METRONIDAZOLE 500 MG/1
500 TABLET ORAL 2 TIMES DAILY
Qty: 14 TABLET | Refills: 0 | Status: SHIPPED | OUTPATIENT
Start: 2023-02-23 | End: 2023-03-02

## 2023-02-23 NOTE — TELEPHONE ENCOUNTER
I spoke to interventional radiology and then scheduling who both confirmed it needs to be scheduled an they are unsure who would have ever told her that she didn't need to schedule. Patient is to call when she starts a cycle and ask for central scheduling to get scheduled. Central scheduling has made some notes in her chart documenting this. Patient is aware.

## 2023-02-23 NOTE — TELEPHONE ENCOUNTER
Patient called. She got a call from [facility] to about HSG and they told her she did not have to schedule that she could just walk in. Told pt no that is not correct, it has to be with interventional radiologist and is a scheduled procedure. I called [facility] and left a voicemail with interventional radiology to clarify how to get her scheduled.

## 2023-02-23 NOTE — TELEPHONE ENCOUNTER
I called patient and informed her that Flagyl rx would be sent to the pharmacy. She is to start taking this rx 1 day prior to her HSG to prevent infection.

## 2023-03-01 DIAGNOSIS — N91.1 AMENORRHEA, SECONDARY: ICD-10-CM

## 2023-03-01 DIAGNOSIS — O02.81 INAPPROPRIATE CHANGE IN QUANTITATIVE HCG IN EARLY PREGNANCY: ICD-10-CM

## 2023-03-01 DIAGNOSIS — O00.202 LEFT OVARIAN PREGNANCY WITHOUT INTRAUTERINE PREGNANCY: ICD-10-CM

## 2023-03-16 ENCOUNTER — HOSPITAL ENCOUNTER (OUTPATIENT)
Dept: GENERAL RADIOLOGY | Age: 38
Discharge: HOME | End: 2023-03-18
Payer: COMMERCIAL

## 2023-03-16 ENCOUNTER — HOSPITAL ENCOUNTER (OUTPATIENT)
Age: 38
Discharge: HOME | End: 2023-03-16
Payer: COMMERCIAL

## 2023-03-16 DIAGNOSIS — Z87.59 HISTORY OF ECTOPIC PREGNANCY: ICD-10-CM

## 2023-03-16 DIAGNOSIS — O00.102 LEFT TUBAL PREGNANCY, UNSPECIFIED WHETHER INTRAUTERINE PREGNANCY PRESENT: ICD-10-CM

## 2023-03-16 DIAGNOSIS — O00.102 LEFT TUBAL PREGNANCY, UNSPECIFIED WHETHER INTRAUTERINE PREGNANCY PRESENT: Primary | ICD-10-CM

## 2023-03-16 LAB — HCG QUANTITATIVE: <1 MIU/ML

## 2023-03-16 PROCEDURE — 2709999900 XR HYSTEROSALPINGOGRAPHY S&I

## 2023-03-16 PROCEDURE — 6360000004 HC RX CONTRAST MEDICATION: Performed by: OBSTETRICS & GYNECOLOGY

## 2023-03-16 PROCEDURE — 36415 COLL VENOUS BLD VENIPUNCTURE: CPT

## 2023-03-16 PROCEDURE — 84702 CHORIONIC GONADOTROPIN TEST: CPT

## 2023-03-16 RX ADMIN — IOPAMIDOL 10 ML: 612 INJECTION, SOLUTION INTRAVENOUS at 12:06

## 2023-10-19 ENCOUNTER — TELEPHONE (OUTPATIENT)
Dept: OBGYN CLINIC | Age: 38
End: 2023-10-19

## 2023-10-19 ENCOUNTER — INITIAL CONSULT (OUTPATIENT)
Dept: OBGYN CLINIC | Age: 38
End: 2023-10-19

## 2023-10-19 ENCOUNTER — HOSPITAL ENCOUNTER (OUTPATIENT)
Age: 38
Setting detail: SPECIMEN
Discharge: HOME | End: 2023-10-19

## 2023-10-19 VITALS
HEIGHT: 65 IN | WEIGHT: 176.2 LBS | BODY MASS INDEX: 29.36 KG/M2 | SYSTOLIC BLOOD PRESSURE: 110 MMHG | DIASTOLIC BLOOD PRESSURE: 70 MMHG

## 2023-10-19 DIAGNOSIS — Z31.49 PROCREATION MANAGEMENT INVESTIGATION AND TESTING: ICD-10-CM

## 2023-10-19 DIAGNOSIS — Z87.59 HISTORY OF ECTOPIC PREGNANCY: ICD-10-CM

## 2023-10-19 DIAGNOSIS — Z31.49 PROCREATION MANAGEMENT INVESTIGATION AND TESTING: Primary | ICD-10-CM

## 2023-10-19 ASSESSMENT — ENCOUNTER SYMPTOMS
GASTROINTESTINAL NEGATIVE: 1
RESPIRATORY NEGATIVE: 1

## 2023-10-19 NOTE — PROGRESS NOTES
PROBLEM VISIT     Date of service: 10/19/2023    Abimbola Ricks  Is a 45 y.o.  female    PT's PCP is: Mango Rivera DO     : 1985                                          HPI Here to discuss next steps and options for desire to conceive. Has most recently had 2 ectopic pregnancy - on the left. Right tube/ovary removed. Has HSG done in 2023 - patent. Cycles are regular and average 26 days, bleeds for 5 days. PCP recently did labs - not on file but in care everywhere. IUI \"years ago\" at [facility] and semen count was low so unable to continue that process. Has met with RE in the past also. Denies further concerns/changes. Review of Systems   Constitutional: Negative. HENT: Negative. Respiratory: Negative. Gastrointestinal: Negative. Genitourinary: Negative. Neurological: Negative. Psychiatric/Behavioral: Negative. Patient's last menstrual period was 2023 (exact date). OB History    Para Term  AB Living   5 4 4   1 4   SAB IAB Ectopic Molar Multiple Live Births       1     4      # Outcome Date GA Lbr Sean/2nd Weight Sex Delivery Anes PTL Lv   5 Ectopic 2016 6w0d          4 Term 2008 38w0d  2.807 kg (6 lb 3 oz) M Vag-Spont   KAUSHAL   3 Term 2007 38w0d  2.835 kg (6 lb 4 oz) M Vag-Spont   KAUSHAL   2 Term 2006 40w0d  2.977 kg (6 lb 9 oz) M Vag-Spont   KAUSHAL      Birth Comments: water birth   1 Term 2003 40w0d  3.402 kg (7 lb 8 oz) F Vag-Spont   KAUSHAL      Birth Comments: water birth        Social History     Tobacco Use   Smoking Status Former    Packs/day: 0.25    Years: 3.00    Additional pack years: 0.00    Total pack years: 0.75    Types: Cigarettes   Smokeless Tobacco Never        Social History     Substance and Sexual Activity   Alcohol Use Yes    Comment: occasionally       Allergies: Patient has no known allergies.       Current Outpatient Medications:     Prenatal Vit-Fe Fumarate-FA (PRENATAL VITAMIN PO), Take by mouth, Disp:

## 2023-10-20 LAB — PROGEST SERPL-MCNC: 4.84 NG/ML

## 2023-10-22 LAB — ANTI-MULLERIAN HORMONE: 1.81 NG/ML (ref 0.18–11.71)
